# Patient Record
Sex: MALE | Race: WHITE | Employment: OTHER | URBAN - METROPOLITAN AREA
[De-identification: names, ages, dates, MRNs, and addresses within clinical notes are randomized per-mention and may not be internally consistent; named-entity substitution may affect disease eponyms.]

---

## 2021-12-08 ENCOUNTER — OFFICE VISIT (OUTPATIENT)
Dept: ORTHOPEDIC SURGERY | Age: 61
End: 2021-12-08
Payer: COMMERCIAL

## 2021-12-08 DIAGNOSIS — M25.562 LEFT KNEE PAIN, UNSPECIFIED CHRONICITY: Primary | ICD-10-CM

## 2021-12-08 PROCEDURE — 99202 OFFICE O/P NEW SF 15 MIN: CPT | Performed by: PHYSICIAN ASSISTANT

## 2021-12-08 NOTE — PROGRESS NOTES
filed for this visit.  -Oriented to person, place, and time  -mood and affect are appropriate    Knee exam - left knee exam shows;    -range of motion of R. Knee is full , and L. Knee is full. The patient does have  pain on motion  -there is not an effusion  - there is tenderness over the  medial region  -there are not any masses  - there is not ligamentous instability  -there is not  deformity noted. - tenderness laxity is not noted with  Valgus stress test.   -  tenderness laxity is not noted with  Varus stress test  -Mc Murrays testing Positive Medial/Positive Medial  -Anterior/posterior drawer testing negative    Contralateral Exam:  -No obvious deformities  -No abrasions or cellulitis noted, NVI   -Full ROM   -No joint laxity  -no palpable tenderness noted    Neurological exam:   -Deep tendon reflexes are Normal at the ankles with strong extensor hallicus longus motor strength bilaterally. --Distal pulses DP/PT:  present 2+    Skin exam:  There is not any cellulitis, lymphedema or cutaneous lesions noted in the lower extremities. Xray:  No orders to display     4v left knee  no fracture or dislocation noted      Assessment:   left knee strain, suspect possible small medial meniscus tear      Plan:  Educational materials distributed. Rest, ice, compression, and elevation (RICE) therapy. OTC analgesics as needed. PT referral.  Follow up in 2 weeks. Reviewed with him the possibility of meniscus injury. He does have a brace he said has helped him and he is doing some massage that seems to help as well. Discussed if PT does not improve his symptoms may benefit fro MRI to further evaluate. He is in agreement and will f/u with us in the next few weeks after he sees how PT goes. No orders of the defined types were placed in this encounter.

## 2021-12-14 ENCOUNTER — OFFICE VISIT (OUTPATIENT)
Dept: ORTHOPEDIC SURGERY | Age: 61
End: 2021-12-14
Payer: COMMERCIAL

## 2021-12-14 VITALS — WEIGHT: 315 LBS | HEIGHT: 69 IN | BODY MASS INDEX: 46.65 KG/M2

## 2021-12-14 DIAGNOSIS — S83.242A ACUTE MEDIAL MENISCUS TEAR, LEFT, INITIAL ENCOUNTER: Primary | ICD-10-CM

## 2021-12-14 DIAGNOSIS — M25.462 EFFUSION OF LEFT KNEE: ICD-10-CM

## 2021-12-14 DIAGNOSIS — M17.12 PRIMARY OSTEOARTHRITIS OF LEFT KNEE: ICD-10-CM

## 2021-12-14 DIAGNOSIS — M25.562 LEFT KNEE PAIN, UNSPECIFIED CHRONICITY: ICD-10-CM

## 2021-12-14 PROCEDURE — 99204 OFFICE O/P NEW MOD 45 MIN: CPT | Performed by: INTERNAL MEDICINE

## 2021-12-14 PROCEDURE — L1812 KO ELASTIC W/JOINTS PRE OTS: HCPCS | Performed by: INTERNAL MEDICINE

## 2021-12-14 RX ORDER — KETOROLAC TROMETHAMINE 10 MG/1
10 TABLET, FILM COATED ORAL 3 TIMES DAILY
Qty: 15 TABLET | Refills: 0 | Status: SHIPPED | OUTPATIENT
Start: 2021-12-14

## 2021-12-14 NOTE — PROGRESS NOTES
Chief Complaint:   Chief Complaint   Patient presents with    Knee Pain     left - onset 3 months ago - flared 6 wks ago. medial and posterior, pain is achy. doing much better now. History of Present Illness:       Patient is a 64 y.o. male presents with the above complaint. The symptoms began 3 weeksago and started with an injury. The patient describes a sharp, aching pain that does radiate. The symptoms are intermittent  and are show no change since the onset. He was working in the yard at the time of injury and felt a subjective \"pop\" in his knee associate with pain. He elected to treat this conservatively ultimately presented to emergency room 3 weeks ago was treated and released and most recently was seen in the after-hours orthopedic clinic. X-rays were performed as outlined below. Pain localizes to the medial side of the knee and  does not seems to follow a typical patella femoral provacative pattern. There are not mechanical symptoms that are active at this time. .  The patient denies subjective instability about the knee and denies new onset weakness of the lower extremity. Pain level 3    The patient denies a pattern of activity related swelling. Treatment to date: NSAIDS OTC with no improvement. There is no prior history of knee trauma. There is no prior history of autoimmune disease, inflammatory arthropathy, or crystal arthropathy. Past Medical History:      History reviewed. No pertinent past medical history. History reviewed. No pertinent surgical history.       Present Medications:         Current Outpatient Medications   Medication Sig Dispense Refill    ketorolac (TORADOL) 10 MG tablet Take 1 tablet by mouth three times daily Discontinue all other NSAIDs during the course of treatment and resumed thereafter 15 tablet 0    diclofenac sodium (VOLTAREN) 1 % GEL Apply 2 g topically (Patient not taking: Reported on 12/14/2021)       No current facility-administered medications for this visit. Allergies: Allergies   Allergen Reactions    Other Rash     Horse Dander        Social History:         Social History     Socioeconomic History    Marital status:      Spouse name: Not on file    Number of children: Not on file    Years of education: Not on file    Highest education level: Not on file   Occupational History    Not on file   Tobacco Use    Smoking status: Never Smoker    Smokeless tobacco: Never Used   Substance and Sexual Activity    Alcohol use: Not on file    Drug use: Not on file    Sexual activity: Not on file   Other Topics Concern    Not on file   Social History Narrative    Not on file     Social Determinants of Health     Financial Resource Strain:     Difficulty of Paying Living Expenses: Not on file   Food Insecurity:     Worried About Running Out of Food in the Last Year: Not on file    Michelle of Food in the Last Year: Not on file   Transportation Needs:     Lack of Transportation (Medical): Not on file    Lack of Transportation (Non-Medical):  Not on file   Physical Activity:     Days of Exercise per Week: Not on file    Minutes of Exercise per Session: Not on file   Stress:     Feeling of Stress : Not on file   Social Connections:     Frequency of Communication with Friends and Family: Not on file    Frequency of Social Gatherings with Friends and Family: Not on file    Attends Sikh Services: Not on file    Active Member of Clubs or Organizations: Not on file    Attends Club or Organization Meetings: Not on file    Marital Status: Not on file   Intimate Partner Violence:     Fear of Current or Ex-Partner: Not on file    Emotionally Abused: Not on file    Physically Abused: Not on file    Sexually Abused: Not on file   Housing Stability:     Unable to Pay for Housing in the Last Year: Not on file    Number of [place] in the Last Year: Not on file    Unstable Housing in the Last Year: Not on file        Review of Symptoms:    Pertinent items are noted in HPI    Review of systems reviewed from Patient History Form dated on today's date and   available in the patient's chart under the Media tab. Vital Signs: There were no vitals filed for this visit. General Exam:     Constitutional: Patient is adequately groomed with no evidence of malnutrition  Mental Status: The patient is oriented to time, place and person. The patient's mood and affect are appropriate. Vascular: Examination reveals no swelling or calf tenderness. Peripheral pulses are palpable and 2+. Lymphatics: no lymphadenopathy of the inguinal region or lower extremity      Physical Exam: left knee      Primary Exam:    Inspection: Mild effusion no deformity or atrophy      Palpation: Focal tenderness of the posterior medial joint line      Range of Motion: 0/120 only mild subjective tightness      Strength: Normal with SLR      Special Tests:   Lachman test negative, collateral ligament stressing stable, anterior/posterior drawer negative, medial and lateral [facility] testing negative, patella femoral provacative Negative      Skin: There are no rashes, ulcerations or lesions. Gait: Minimally antalgic      Reflex intact lower     Additional Comments:        Additional Examinations:           Right Lower Extremity: Examination of the right lower extremity does not show any tenderness, deformity or injury. Range of motion is unremarkable. There is no gross instability. There are no rashes, ulcerations or lesions. Strength and tone are normal.   Neurolgic -Light touch sensation and manual muscle testing normal L2-S1. No fasiculations. Pattella tendon and Achilles tendon reflexes +2 bilaterally.   Seated SLR negative        Office Imaging Results/Procedures PerformedToday:            Office Procedures:     Orders Placed This Encounter   Procedures    Breg Economy Hinged Knee Brace     Patient was prescribed a Breg Economy Hinged Knee Brace. The left knee will require stabilization / immobilization from this semi-rigid / rigid orthosis to improve their function. The orthosis will assist in protecting the affected area, provide functional support and facilitate healing. The patient was educated and fit by a healthcare professional with expert knowledge and specialization in brace application while under the direct supervision of the treating physician. Verbal and written instructions for the use of and application of this item were provided. They were instructed to contact the office immediately should the brace result in increased pain, decreased sensation, increased swelling or worsening of the condition. Other Outside Imaging and Testing Personally Reviewed:    XR KNEE LEFT (MIN 4 VIEWS)    Result Date: 12/8/2021  Radiology exam is complete. No Radiologist dictation. Please follow up with ordering provider. X-rays reviewed from 12/8/2021: There is grade 1-2 degenerative change affecting the tibiofemoral joints bilaterally lateral projection left knee patellofemoral joint is anatomic subtle spurring from the tibia posteriorly vertebral junction reveals grade 1-2 degenerative change normal alignment of the patella        Assessment   Impression: . Encounter Diagnoses   Name Primary?  Acute medial meniscus tear, left, initial encounter Yes    Primary osteoarthritis of left knee     Effusion of left knee     Left knee pain, unspecified chronicity               Plan:     MRI evaluation left knee evaluate severity of meniscal pathology suspected clinically  Activity modification meniscal protocol and functional knee bracing  Trial of high-dose NSAIDs-Toradol with GI precaution  Clinical follow-up after MRI    The nature of the finding, probable diagnosis and likely treatment was thoroughly discussed with the patient.  The options, risks, complications, alternative treatment as well as some of the differential diagnosis was discussed. The patient was thoroughly informed and all questions were answered. the patient indicated understanding and satisfaction with the discussion. Approximately 45  minutes was spent related to previewing pertinent medical documentation prior to the patient's visit along with counseling during the patient's visit with respect to treatment options inclusive of alternatives to treatment and the complications and risks related to those treatment options along with expectations of outcome related to those treatments and inclusive of time in the documentation and ordering of testing and treatment after the visit. Orders:        Orders Placed This Encounter   Procedures    Breg Economy Hinged Knee Brace     Patient was prescribed a Breg Economy Hinged Knee Brace. The left knee will require stabilization / immobilization from this semi-rigid / rigid orthosis to improve their function. The orthosis will assist in protecting the affected area, provide functional support and facilitate healing. The patient was educated and fit by a healthcare professional with expert knowledge and specialization in brace application while under the direct supervision of the treating physician. Verbal and written instructions for the use of and application of this item were provided. They were instructed to contact the office immediately should the brace result in increased pain, decreased sensation, increased swelling or worsening of the condition. Disclaimer: \"This note was dictated with voice recognition software. Though review and correction are routine, we apologize for any errors. \"

## 2021-12-23 ENCOUNTER — OFFICE VISIT (OUTPATIENT)
Dept: ORTHOPEDIC SURGERY | Age: 61
End: 2021-12-23
Payer: COMMERCIAL

## 2021-12-23 VITALS — WEIGHT: 315 LBS | HEIGHT: 69 IN | BODY MASS INDEX: 46.65 KG/M2

## 2021-12-23 DIAGNOSIS — S83.232D COMPLEX TEAR OF MEDIAL MENISCUS OF LEFT KNEE AS CURRENT INJURY, SUBSEQUENT ENCOUNTER: ICD-10-CM

## 2021-12-23 DIAGNOSIS — M17.12 PRIMARY OSTEOARTHRITIS OF LEFT KNEE: ICD-10-CM

## 2021-12-23 PROCEDURE — 99214 OFFICE O/P EST MOD 30 MIN: CPT | Performed by: INTERNAL MEDICINE

## 2021-12-23 NOTE — PROGRESS NOTES
Chief Complaint:   Chief Complaint   Patient presents with    Knee Pain     left, about the same, still lingering soreness          History of Present Illness:       Patient is a 64 y.o. male returns follow up for the above complaint. The patient was last seen approximately 12 daysago. The symptoms show no change since the last visit. In the interim MRI completed which is outlined below in detail. The did not remember to start the course of Toradol despite the symptoms are unchanged    He continues with functional knee bracing    Pain localized to the medial compartment region of the knee    Pain levels 1/10    No pattern of active related swelling he denies any mechanical symptoms at this time         Past Medical History:      No past medical history on file. Present Medications:         Current Outpatient Medications   Medication Sig Dispense Refill    diclofenac sodium (VOLTAREN) 1 % GEL Apply 2 g topically (Patient not taking: Reported on 12/14/2021)      ketorolac (TORADOL) 10 MG tablet Take 1 tablet by mouth three times daily Discontinue all other NSAIDs during the course of treatment and resumed thereafter 15 tablet 0     No current facility-administered medications for this visit. Allergies: Allergies   Allergen Reactions    Other Rash     Horse Dander           Review of Systems:    Pertinent items are noted in HPI       Vital Signs: There were no vitals filed for this visit. General Exam:     Constitutional: Patient is adequately groomed with no evidence of malnutrition    Physical Exam: left knee      Primary Exam:    Inspection: Appreciable effusion      Palpation: Mild tenderness over the M JL      Range of Motion: Stable unchanged from previous normal with SLR      Strength: Normal with SLR      Special Tests: Steinmann's test negative      Skin: There are no rashes, ulcerations or lesions.       Gait: Nonantalgic      Neurovascular - non focal and intact       Additional Comments:        Additional Examinations:                   Office Imaging Results/Procedures PerformedToday:           Office Procedures:     Orders Placed This Encounter   Procedures    Ambulatory referral to Physical Therapy     Referral Priority:   Routine     Referral Type:   Eval and Treat     Referral Reason:   Specialty Services Required     Number of Visits Requested:   1    [address] [facility] (For Auth/Precert)     Standing Status:   Future     Standing Expiration Date:   2022           Other Outside Imaging and Testing Personally Reviewed:    MRI LOWER EXTREMITY W JT WO CONTRAST    Result Date: 2021  [facility]: [id number] #: [id number] Procedure: MR Left Knee w/o Contrast ; Reason for Exam: Other tear of medial meniscus, current injury, left knee, initial encounter, Pain in left knee, Unilateral primary osteoarthritis, left knee, Effusion, left knee This document is confidential medical information. Unauthorized disclosure or use of this information is prohibited by law. If you are not the intended recipient of this document, please advise us by calling immediately [phone]. [place] [place] [address] [address] Patient Name: Margarita Munroe Case ID: [id number] Patient : 1960 Referring Physician: Rosalio Ocasio MD Exam Date: 2021 Exam Description: MR Left Knee w/o Contrast HISTORY:  Acute medial meniscus tear. Left knee pain. Primary osteoarthritis. Left knee effusion. TECHNICAL FACTORS:  Long- and short-axis fat- and water-weighted images were performed. COMPARISON:  None. FINDINGS:  The ACL and PCL are intact.  In the medial compartment, at least a 2.5cm long, complex, inflamed tear involving the outer surface of the posterior horn-body junction of the medial meniscus with an undersurface cleavage and radial longitudinal components is seen resulting in extrusion into an inflamed medial gutter. 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 No osteoarthritis. The MCL is intact. Lateral compartment without meniscal tears, chondromalacia or osteoarthritis. Intact lateral collateral ligament complex. Anterior compartment with multifocal penetrating chondral fissures and mild subchondral osteoedema along the lateral trochlea (grade 4 chondromalacia). No osteoarthritis. No patellar or trochlear dysplasia. No patellar dislocation or subluxation. The MPFL and lateral  patellar retinaculum are intact. Small suprapatellar effusion. Quadriceps and patellar tendons are normal. Small fluid distension of the gastrocnemius/semimembranous bursa. Flexor mechanism and neurovascular bundle are unremarkable. CONCLUSION: 1. A 2.5cm, complex, inflamed tear at the outer zone of posterior horn and body of medial meniscus with undersurface cleavage and radial longitudinal components extruding into an inflamed medial gutter. 2. Penetrating chondral fissures along the lateral trochlea with mild subchondral osteoedema (grade 4 chondromalacia). 3. Small suprapatellar effusion. Thank you for the opportunity to provide your interpretation. Mariely Lara MD A: SARAHI/TORRES/yudelka 12/22/2021 8:13 PM T: YUDELKA 12/22/2021 10:55 AM              Assessment   Impression: . Encounter Diagnoses   Name Primary?  Primary osteoarthritis of left knee     Complex tear of medial meniscus of left knee as current injury, subsequent encounter             Plan:        Activity modification lumbar disc protocol  Celebrex daily with GI precaution and as needed use of Flexeril nightly  MRI evaluation lumbar spine evaluate severity of discopathy/stenosis suspected clinically  Consider  for lumbar spine intervention injection as needed           Orders:        Orders Placed This Encounter   Procedures    Ambulatory referral to Physical Therapy     Referral Priority:   Routine     Referral Type:   Eval and Treat     Referral Reason: Specialty Services Required     Number of Visits Requested:   1    [address] [facility] (For Auth/Precert)     Standing Status:   Future     Standing Expiration Date:   12/23/2022         Gareth Sheppard MD.      Disclaimer: \"This note was dictated with voice recognition software. Though review and correction are routine, we apologize for any errors. \"

## 2022-01-03 ENCOUNTER — HOSPITAL ENCOUNTER (OUTPATIENT)
Dept: PHYSICAL THERAPY | Age: 62
Setting detail: THERAPIES SERIES
Discharge: HOME | End: 2022-01-03
Payer: COMMERCIAL

## 2022-01-03 PROCEDURE — 97161 PT EVAL LOW COMPLEX 20 MIN: CPT

## 2022-01-03 PROCEDURE — 97110 THERAPEUTIC EXERCISES: CPT

## 2022-01-03 NOTE — PLAN OF CARE
[facility]  [address] [place] [address]  Phone [phone]   Fax [phone]                                                       Physical Therapy Certification    Dear Referring Practitioner: Nella Sicard, MD,    We had the pleasure of evaluating the following patient for physical therapy services at [address] [place] [address]. A summary of our findings can be found in the initial assessment below. This includes our plan of care. If you have any questions or concerns regarding these findings, please do not hesitate to contact me at the office phone number checked above.   Thank you for the referral.       Physician Signature:_______________________________Date:__________________  By signing above (or electronic signature), therapists plan is approved by physician      Patient: Gissell Pang   : 1960   MRN: [id number]  Referring Physician: Referring Practitioner: Nella Sicard, MD      Evaluation Date: 1/3/2022      Medical Diagnosis Information:  Diagnosis: F45.367F (ICD-10-CM) - Acute medial meniscus tear, left, initial [id number] (ICD-10-CM) - Primary osteoarthritis of left kneeS83.232D (ICD-10-CM) - Complex tear of medial meniscus of left knee as current injury, subsequent encounter   Treatment Diagnosis: L knee dysfunction                                         Insurance information: PT Insurance Information: BCBS     Precautions/ Contra-indications: OA, MRI confirmed meniscus tear L knee    C-SSRS Triggered by Intake questionnaire (Past 2 wk assessment):   [x] No, Questionnaire did not trigger screening.   [] Yes, Patient intake triggered further evaluation      [] C-SSRS Screening completed  [] PCP notified via Plan of Care  [] Emergency services notified     Latex Allergy:  [x]NO      []YES  Preferred Language for Healthcare:   [x]English       []other:    SUBJECTIVE: Patient stated complaint: See body chart     Relevant Medical History: OA  Functional Disability Index: LEFS 47/80    Pain Scale: 0-1/10  Easing factors: massage gun, rest  Provocative factors: See body chart     Type: []Constant   [x]Intermittent  []Radiating [x]Localized []other:     Numbness/Tingling: Patient reports no N/T at this time. Occupation/School: retired      Living Status/Prior Level of Function: Independent with ADLs and IADLs, golf    OBJECTIVE:     ROM LEFT RIGHT   HIP Flex Equal bilateral Equal bilateral   HIP Abd     HIP Ext     HIP IR     HIP ER     Knee ext 0 0   Knee Flex 115 no pain  115   Ankle PF     Ankle DF     Ankle In     Ankle Ev     Strength lb  LEFT RIGHT   HIP Flexors     HIP Abductors 45.0 45.3   HIP Ext     Hip ER     Knee EXT (quad) 64.2 79.8   Knee Flex (HS) 64.9 66.0   Ankle DF     Ankle PF     Ankle Inv     Ankle EV          Circumference  Mid apex  7 cm prox               Balance:      Reflexes/Sensation:    [x]Dermatomes/Myotomes intact    [x]Reflexes equal and normal bilaterally   []Other:     Joint mobility: Patient presents wit normal joint mobility including patellar mobility. [x]Normal    []Hypo   []Hyper    Palpation: Patient reported no joint line tenderness. Mild hamstring tightness noted on palpation. Functional Mobility/Transfers: Patient limited by decreased core strength during session with transitional movements. Posture: Patient presents with a functional knee brace on L. Bandages/Dressings/Incisions: NA    Gait: (include devices/WB status) Patient presents with a toe out gait bilaterally with slightly reduced stance time on L. Orthopedic Special Tests:                        [x] Patient history, allergies, meds reviewed. Medical chart reviewed. See intake form.      Review Of Systems (ROS):  [x]Performed Review of systems (Integumentary, CardioPulmonary, Neurological) by intake and observation. Intake form has been scanned into medical record. Patient has been instructed to contact their primary care physician regarding ROS issues if not already being addressed at this time. Co-morbidities/Complexities (which will affect course of rehabilitation):   []None           Arthritic conditions   []Rheumatoid arthritis (M05.9)  [x]Osteoarthritis (M19.91)   Cardiovascular conditions   []Hypertension (I10)  []Hyperlipidemia (E78.5)  []Angina pectoris (I20)  []Atherosclerosis (I70)   Musculoskeletal conditions   []Disc pathology   []Congenital spine pathologies   []Prior surgical intervention  []Osteoporosis (M81.8)  []Osteopenia (M85.8)   Endocrine conditions   []Hypothyroid (E03.9)  []Hyperthyroid Gastrointestinal conditions   []Constipation (T84.79)   Metabolic conditions   []Morbid obesity (E66.01)  []Diabetes type 1(E10.65) or 2 (E11.65)   []Neuropathy (G60.9)     Pulmonary conditions   []Asthma (J45)  []Coughing   []COPD (J44.9)   Psychological Disorders  []Anxiety (F41.9)  []Depression (F32.9)   []Other:   []Other:          Barriers to/and or personal factors that will affect rehab potential:              []Age  []Sex              []Motivation/Lack of Motivation                        []Co-Morbidities              []Cognitive Function, education/learning barriers              []Environmental, home barriers              []profession/work barriers  []past PT/medical experience  []other:  Justification:     Falls Risk Assessment (30 days):   [x] Falls Risk assessed and no intervention required.   [] Falls Risk assessed and Patient requires intervention due to being higher risk   TUG score (>12s at risk):     [] Falls education provided, including         ASSESSMENT:   Functional Impairments:     [x]Noted lumbar/proximal hip/LE joint hypomobility   []Decreased LE functional ROM   [x]Decreased core/proximal hip strength and neuromuscular control   [x]Decreased LE functional strength   []Reduced balance/proprioceptive control   []other:      Functional Activity Limitations (from functional questionnaire and intake)   [x]Reduced ability to tolerate prolonged functional positions   []Reduced ability or difficulty with changes of positions or transfers between positions   [x]Reduced ability to maintain good posture and demonstrate good body mechanics with sitting, bending, and lifting   []Reduced ability to sleep   [] Reduced ability or tolerance with driving and/or computer work   [x]Reduced ability to perform lifting, carrying tasks   [x]Reduced ability to squat   [x]Reduced ability to forward bend   [x]Reduced ability to ambulate prolonged functional periods/distances/surfaces   [x]Reduced ability to ascend/descend stairs   []Reduced ability to run, hop, cut or jump   []other:    Participation Restrictions   [x]Reduced participation in self care activities   [x]Reduced participation in home management activities   [x]Reduced participation in work activities   [x]Reduced participation in social activities. [x]Reduced participation in sport/recreation activities. Classification :    []Signs/symptoms consistent with post-surgical status including decreased ROM, strength and function.    []Signs/symptoms consistent with joint sprain/strain   []Signs/symptoms consistent with patella-femoral syndrome   []Signs/symptoms consistent with knee OA/hip OA   [x]Signs/symptoms consistent with internal derangement of knee/Hip   []Signs/symptoms consistent with functional hip weakness/NMR control      []Signs/symptoms consistent with tendinitis/tendinosis    []signs/symptoms consistent with pathology which may benefit from Dry needling      []other:      Prognosis/Rehab Potential:      []Excellent   [x]Good    []Fair   []Poor    Tolerance of evaluation/treatment:    []Excellent   [x]Good    []Fair   []Poor    Physical Therapy Evaluation Complexity Justification  [x] A history of present problem with:  [] no personal factors and/or comorbidities that impact the plan of care;  [x]1-2 personal factors and/or comorbidities that impact the plan of care  []3 personal factors and/or comorbidities that impact the plan of care  [x] An examination of body systems using standardized tests and measures addressing any of the following: body structures and functions (impairments), activity limitations, and/or participation restrictions;:  [x] a total of 1-2 or more elements   [] a total of 3 or more elements   [] a total of 4 or more elements   [x] A clinical presentation with:  [x] stable and/or uncomplicated characteristics   [] evolving clinical presentation with changing characteristics  [] unstable and unpredictable characteristics;   [x] Clinical decision making of [x] low, [] moderate, [] high complexity using standardized patient assessment instrument and/or measurable assessment of functional outcome. [x] EVAL (LOW) 07618 (typically 30 minutes face-to-face)  [] EVAL (MOD) 14282 (typically 30 minutes face-to-face)  [] EVAL (HIGH) 52136 (typically 45 minutes face-to-face)  [] RE-EVAL     PLAN:   Frequency/Duration:  1-2 days per week for 4-6 Weeks:  Interventions:  [x]  Therapeutic exercise including: strength training, ROM, for Lower extremity and core   [x]  NMR activation and proprioception for LE, Glutes and Core   [x]  Manual therapy as indicated for LE, Hip and spine to include: Dry Needling/IASTM, STM, PROM, Gr I-IV mobilizations, manipulation. [x] Modalities as needed that may include: thermal agents, E-stim, Biofeedback, US, iontophoresis as indicated  [x] Patient education on joint protection, postural re-education, activity modification, progression of HEP. HEP instruction: (see scanned forms)    GOALS:  Patient stated goal: To gain strength in the L leg. [] Progressing: [] Met: [] Not Met: [] Adjusted    Therapist goals for Patient:   Short Term Goals: To be achieved in: 2 weeks  1.  Independent in HEP and progression per patient tolerance, in order to prevent re-injury. [] Progressing: [] Met: [] Not Met: [] Adjusted  2. Patient will have a decrease in pain to facilitate improvement in movement, function, and ADLs as indicated by Functional Deficits. [] Progressing: [] Met: [] Not Met: [] Adjusted    Long Term Goals: To be achieved in: 4-6 weeks  1. Disability index score of 25% or less for the LEFS to assist with reaching prior level of function. [] Progressing: [] Met: [] Not Met: [] Adjusted  2. Patient will demonstrate an increase in Strength to good proximal hip strength and control, within 5lb HHD in LE to allow for proper functional mobility as indicated by patients Functional Deficits. [] Progressing: [] Met: [] Not Met: [] Adjusted  3. Patient will return to prolonged ambulation, squatting, stair ascending/descending, yard/housework, and functional activities without increased symptoms or restriction. [] Progressing: [] Met: [] Not Met: [] Adjusted  4.  To gain strength in the L LE to return to PLOF (patient specific functional goal)    [] Progressing: [] Met: [] Not Met: [] Adjusted     Electronically signed by:  Carly Noland PT

## 2022-01-03 NOTE — FLOWSHEET NOTE
[facility]    Physical Therapy Treatment Note/ Progress Report:     Date:  1/3/2022    Patient Name:  Elder Torres    :  1960  MRN: [id number]  Medical/Treatment Diagnosis Information:  · Diagnosis: Z52.664C (ICD-10-CM) - Acute medial meniscus tear, left, initial dhujfyzuhG01.12 (ICD-10-CM) - Primary osteoarthritis of left kneeS83.232D (ICD-10-CM) - Complex tear of medial meniscus of left knee as current injury, subsequent encounter  · Treatment Diagnosis: L knee dysfunction  Insurance/Certification information:  PT Insurance Information: [facility]  Physician Information:  Referring Practitioner: Silvia Juares MD  Plan of care signed (Y/N):     Date of Patient follow up with Physician:      Progress Report: []  Yes  []  No     Functional Scale:  LEFS: 47/80  Date: 1/3/22    Date Range for reporting period:  Beginnin/3/22  Ending:      Progress report due (10 Rx/or 30 days whichever is less): 71     Recertification due (POC duration/ or 90 days whichever is less): 22     Visit # Insurance Allowable Auth Needed   1 MN []Yes    []No     Pain level:  0-1/10     SUBJECTIVE:  See eval    OBJECTIVE: See eval   Observation:    Test measurements:      RESTRICTIONS/PRECAUTIONS:  prolonged ambulation, squatting, stair ascending/descending, yard/housework,, pivoting    Exercises/Interventions:     Therapeutic Ex 25' Resistance Sets/sec Reps Notes          SLR  1 10    bridges  2 8 5 sec hold    Wall sit  4 To fatigue    Side steps  4 10ft    ecc calf raise  1 20    Ecc squat tap   2 15                                         Therapeutic Activities                                                               Manual Intervention       Knee mobs/PROM       Tib/Fem Mobs       Patella Mobs       Ankle mobs                     NMR re-education                                                                          Therapeutic Exercise and NMR EXR  [] (51607) Provided verbal/tactile cueing for activities related to strengthening, flexibility, endurance, ROM for improvements in LE, proximal hip, and core control with self care, mobility, lifting, ambulation.  [] (17369) Provided verbal/tactile cueing for activities related to improving balance, coordination, kinesthetic sense, posture, motor skill, proprioception  to assist with LE, proximal hip, and core control in self care, mobility, lifting, ambulation and eccentric single leg control.      NMR and Therapeutic Activities:    [] (22303 or 77281) Provided verbal/tactile cueing for activities related to improving balance, coordination, kinesthetic sense, posture, motor skill, proprioception and motor activation to allow for proper function of core, proximal hip and LE with self care and ADLs  [] (22107) Gait Re-education- Provided training and instruction to the patient for proper LE, core and proximal hip recruitment and positioning and eccentric body weight control with ambulation re-education including up and down stairs     Home Exercise Program:    [x] (68401) Reviewed/Progressed HEP activities related to strengthening, flexibility, endurance, ROM of core, proximal hip and LE for functional self-care, mobility, lifting and ambulation/stair navigation   [] (11411)Reviewed/Progressed HEP activities related to improving balance, coordination, kinesthetic sense, posture, motor skill, proprioception of core, proximal hip and LE for self care, mobility, lifting, and ambulation/stair navigation      Manual Treatments:  PROM / STM / Oscillations-Mobs:  G-I, II, III, IV (PA's, Inf., Post.)  [] (96705) Provided manual therapy to mobilize LE, proximal hip and/or LS spine soft tissue/joints for the purpose of modulating pain, promoting relaxation,  increasing ROM, reducing/eliminating soft tissue swelling/inflammation/restriction, improving soft tissue extensibility and allowing for proper ROM for normal function with self care, mobility, lifting and ambulation. Modalities:      Charges:  Timed Code Treatment Minutes: 25   Total Treatment Minutes: 55       [x] EVAL (LOW) 35648 (typically 20 minutes face-to-face)  [] EVAL (MOD) 86023 (typically 30 minutes face-to-face)  [] EVAL (HIGH) 06782 (typically 45 minutes face-to-face)  [] RE-EVAL     [x] JS(98484) x  2         [] IONTO (84944)  [] NMR (38777) x     [] VASO (99960)  [] Manual (52136) x     [] Other:  [] TA (97987)x     [] Mech Traction (15348)  [] ES(attended) (05952)     [] ES (un) (77660): If BWC Please Indicate Time In/Out and Total Minutes  CPT Code Time in Time out Total Min                                         GOALS:  Patient stated goal: To gain strength in the L leg. [] Progressing: [] Met: [] Not Met: [] Adjusted    Therapist goals for Patient:   Short Term Goals: To be achieved in: 2 weeks  1. Independent in HEP and progression per patient tolerance, in order to prevent re-injury. [] Progressing: [] Met: [] Not Met: [] Adjusted  2. Patient will have a decrease in pain to facilitate improvement in movement, function, and ADLs as indicated by Functional Deficits. [] Progressing: [] Met: [] Not Met: [] Adjusted    Long Term Goals: To be achieved in: 4-6 weeks  1. Disability index score of 25% or less for the LEFS to assist with reaching prior level of function. [] Progressing: [] Met: [] Not Met: [] Adjusted  2. Patient will demonstrate an increase in Strength to good proximal hip strength and control, within 5lb HHD in LE to allow for proper functional mobility as indicated by patients Functional Deficits. [] Progressing: [] Met: [] Not Met: [] Adjusted  3. Patient will return to prolonged ambulation, squatting, stair ascending/descending, yard/housework, and functional activities without increased symptoms or restriction. [] Progressing: [] Met: [] Not Met: [] Adjusted  4.  To gain strength in the L LE to return to PLOF (patient specific functional goal)    [] Progressing: [] Met: [] Not Met: [] Adjusted     Progression Towards Functional goals:  [] Patient is progressing as expected towards functional goals listed. [] Progression is slowed due to complexities listed. [] Progression has been slowed due to co-morbidities. [x] Plan just implemented, too soon to assess goals progression  [] Other:     ASSESSMENT:  See eval    Return to Play: (if applicable)   []  Stage 1: Intro to Strength   []  Stage 2: Return to Run and Strength   []  Stage 3: Return to Jump and Strength   []  Stage 4: Dynamic Strength and Agility   []  Stage 5: Sport Specific Training     []  Ready to Return to Play, Meets All Above Stages   []  Not Ready for Return to Sports   Comments:            Treatment/Activity Tolerance:  [x] Patient tolerated treatment well [] Patient limited by fatique  [] Patient limited by pain  [] Patient limited by other medical complications  [] Other:     Overall Progression Towards Functional goals/ Treatment Progress Update:  [] Patient is progressing as expected towards functional goals listed. [] Progression is slowed due to complexities/Impairments listed. [] Progression has been slowed due to co-morbidities. [x] Plan just implemented, too soon to assess goals progression <30days   [] Goals require adjustment due to lack of progress  [] Patient is not progressing as expected and requires additional follow up with physician  [] Other    Prognosis for POC: [x] Good [] Fair  [] Poor    Patient requires continued skilled intervention: [x] Yes  [] No        PLAN: See eval  [] Continue per plan of care [] Alter current plan (see comments)  [x] Plan of care initiated [] Hold pending MD visit [] Discharge    Electronically signed by: Arianna Frias PT    Note: If patient does not return for scheduled/recommended follow up visits, this note will serve as a discharge from care along with the most recent update on progress.

## 2022-01-10 ENCOUNTER — HOSPITAL ENCOUNTER (OUTPATIENT)
Dept: PHYSICAL THERAPY | Age: 62
Setting detail: THERAPIES SERIES
Discharge: HOME | End: 2022-01-10
Payer: COMMERCIAL

## 2022-01-10 NOTE — PROGRESS NOTES
[facility]    Physical Therapy  Cancellation/No-show Note  Patient Name:  Breanna Aden  :  1960   Date:  1/10/2022  Cancelled visits to date: 1  No-shows to date: 0    For today's appointment patient:  [x]  Cancelled  []  Rescheduled appointment  []  No-show     Reason given by patient:  []  Patient ill  []  Conflicting appointment  []  No transportation    []  Conflict with work  []  No reason given  [x]  Other:  Patient is currently out of town. Comments:      Phone call information:   []  Phone call made today to patient at _ time at number provided:      []  Patient answered, conversation as follows:    []  Patient did not answer, message left as follows:  []  Phone call not made today  [x]  Phone call not needed - pt contacted us to cancel and provided reason for cancellation.      Electronically signed by:  Nishi Carbone, PT, PT

## 2022-01-18 ENCOUNTER — HOSPITAL ENCOUNTER (OUTPATIENT)
Dept: PHYSICAL THERAPY | Age: 62
Setting detail: THERAPIES SERIES
Discharge: HOME | End: 2022-01-18
Payer: COMMERCIAL

## 2022-01-18 PROCEDURE — 97110 THERAPEUTIC EXERCISES: CPT

## 2022-01-18 NOTE — FLOWSHEET NOTE
[place] [facility] [employer]    Physical Therapy Treatment Note/ Progress Report:     Date:  2022    Patient Name:  Elder Torres    :  1960  MRN: [id number]  Medical/Treatment Diagnosis Information:  · Diagnosis: G16.213H (ICD-10-CM) - Acute medial meniscus tear, left, initial [id number] (ICD-10-CM) - Primary osteoarthritis of left kneeS83.232D (ICD-10-CM) - Complex tear of medial meniscus of left knee as current injury, subsequent encounter  · Treatment Diagnosis: L knee dysfunction  Insurance/Certification information:  PT Insurance Information: [facility]  Physician Information:  Referring Practitioner: Silvia Juares MD  Plan of care signed (Y/N):     Date of Patient follow up with Physician:      Progress Report: []  Yes  []  No     Functional Scale:  LEFS: 47/80  Date: 1/3/22    Date Range for reporting period:  Beginnin/3/22  Ending:      Progress report due (10 Rx/or 30 days whichever is less): 79     Recertification due (POC duration/ or 90 days whichever is less): 22     Visit # Insurance Allowable Auth Needed   2 MN []Yes    []No     Pain level:  0-1/10     SUBJECTIVE:  Patient reports some general ache in the L knee. Patient reports he has increased R groin pain that he feels is related to increased activity. Patient believes he \"pulled\" the L hip yesterday shoveling snow. Patient reports increased ache in L knee following increased activity. Patient continues to wear brace out of the house. Currently R hip is a 4/10.      OBJECTIVE: See eval   Observation:    Test measurements:      RESTRICTIONS/PRECAUTIONS:  prolonged ambulation, squatting, stair ascending/descending, yard/housework,, pivoting    Exercises/Interventions:     Therapeutic Ex 45' Resistance Sets/sec Reps Notes          SLR  2 10 bilateral   bridges  2 8 10 sec hold    Wall sit  4 To fatigue    Side steps  4 10ft    ecc calf raise  1 20    Ecc squat tap   2 15    SL abd  2 8-10     LP DL/SL ecc 140/150/90 2 8-10    CC TKE 20/25 2 10    MH abd/ext 60 2 10             Therapeutic Activities                                                               Manual Intervention       Knee mobs/PROM       Tib/Fem Mobs       Patella Mobs       Ankle mobs                     NMR re-education                                                                          Therapeutic Exercise and NMR EXR  [x] (52248) Provided verbal/tactile cueing for activities related to strengthening, flexibility, endurance, ROM for improvements in LE, proximal hip, and core control with self care, mobility, lifting, ambulation.  [] (33902) Provided verbal/tactile cueing for activities related to improving balance, coordination, kinesthetic sense, posture, motor skill, proprioception  to assist with LE, proximal hip, and core control in self care, mobility, lifting, ambulation and eccentric single leg control.      NMR and Therapeutic Activities:    [] (66417 or 70478) Provided verbal/tactile cueing for activities related to improving balance, coordination, kinesthetic sense, posture, motor skill, proprioception and motor activation to allow for proper function of core, proximal hip and LE with self care and ADLs  [] (78371) Gait Re-education- Provided training and instruction to the patient for proper LE, core and proximal hip recruitment and positioning and eccentric body weight control with ambulation re-education including up and down stairs     Home Exercise Program:    [x] (01892) Reviewed/Progressed HEP activities related to strengthening, flexibility, endurance, ROM of core, proximal hip and LE for functional self-care, mobility, lifting and ambulation/stair navigation   [] (24591)Reviewed/Progressed HEP activities related to improving balance, coordination, kinesthetic sense, posture, motor skill, proprioception of core, proximal hip and LE for self care, mobility, lifting, and within 5lb HHD in LE to allow for proper functional mobility as indicated by patients Functional Deficits. [] Progressing: [] Met: [] Not Met: [] Adjusted  3. Patient will return to prolonged ambulation, squatting, stair ascending/descending, yard/housework, and functional activities without increased symptoms or restriction. [] Progressing: [] Met: [] Not Met: [] Adjusted  4. To gain strength in the L LE to return to PLOF (patient specific functional goal)    [] Progressing: [] Met: [] Not Met: [] Adjusted     Progression Towards Functional goals:  [] Patient is progressing as expected towards functional goals listed. [] Progression is slowed due to complexities listed. [] Progression has been slowed due to co-morbidities. [x] Plan just implemented, too soon to assess goals progression  [] Other:     ASSESSMENT:  Patient tolerated session well with new exercises incorporated. Patient encouraged to continue strengthening at home with HEP. Patient reports apprehension to returning to gym due to [place]. Patient would continue to benefit from strengthening of bilateral LE to improve function. Return to Play: (if applicable)   []  Stage 1: Intro to Strength   []  Stage 2: Return to Run and Strength   []  Stage 3: Return to Jump and Strength   []  Stage 4: Dynamic Strength and Agility   []  Stage 5: Sport Specific Training     []  Ready to Return to Play, Meets All Above Stages   []  Not Ready for Return to Sports   Comments:            Treatment/Activity Tolerance:  [x] Patient tolerated treatment well [] Patient limited by fatique  [] Patient limited by pain  [] Patient limited by other medical complications  [] Other:     Overall Progression Towards Functional goals/ Treatment Progress Update:  [] Patient is progressing as expected towards functional goals listed. [] Progression is slowed due to complexities/Impairments listed. [] Progression has been slowed due to co-morbidities.   [x] Plan just implemented, too soon to assess goals progression <30days   [] Goals require adjustment due to lack of progress  [] Patient is not progressing as expected and requires additional follow up with physician  [] Other    Prognosis for POC: [x] Good [] Fair  [] Poor    Patient requires continued skilled intervention: [x] Yes  [] No        PLAN: Focus on strengthening of the quadriceps and proximal hip. [x] Continue per plan of care [] Alter current plan (see comments)  [] Plan of care initiated [] Hold pending MD visit [] Discharge    Electronically signed by: Matthew Diego PT    Note: If patient does not return for scheduled/recommended follow up visits, this note will serve as a discharge from care along with the most recent update on progress.

## 2022-01-19 ENCOUNTER — TELEPHONE (OUTPATIENT)
Dept: ORTHOPEDIC SURGERY | Age: 62
End: 2022-01-19

## 2022-01-19 NOTE — TELEPHONE ENCOUNTER
LVM for pt that Durolane injection is approved and he can call to sched at anytime, as approval is good until 1/13/23

## 2022-01-24 ENCOUNTER — HOSPITAL ENCOUNTER (OUTPATIENT)
Dept: PHYSICAL THERAPY | Age: 62
Setting detail: THERAPIES SERIES
Discharge: HOME | End: 2022-01-24
Payer: COMMERCIAL

## 2022-01-24 PROCEDURE — 97530 THERAPEUTIC ACTIVITIES: CPT

## 2022-01-24 PROCEDURE — 97110 THERAPEUTIC EXERCISES: CPT

## 2022-01-24 NOTE — FLOWSHEET NOTE
[facility] [employer]    Physical Therapy Treatment Note/ Progress Report:     Date:  2022    Patient Name:  Yeyo Grigsby    :  1960  MRN: [id number]  Medical/Treatment Diagnosis Information:  · Diagnosis: T66.460C (ICD-10-CM) - Acute medial meniscus tear, left, initial [id number] (ICD-10-CM) - Primary osteoarthritis of left kneeS83.232D (ICD-10-CM) - Complex tear of medial meniscus of left knee as current injury, subsequent encounter  · Treatment Diagnosis: L knee dysfunction  Insurance/Certification information:  PT Insurance Information: [facility]  Physician Information:  Referring Practitioner: Jeannie Logan MD  Plan of care signed (Y/N):     Date of Patient follow up with Physician:      Progress Report: [x]  Yes  []  No     Functional Scale:  LEFS: 69/80  Date: 22    Date Range for reporting period:  Beginnin/3/22  Endin22    Progress report due (10 Rx/or 30 days whichever is less):      Recertification due (POC duration/ or 90 days whichever is less): 22     Visit # Insurance Allowable Auth Needed   3 MN []Yes    []No     Pain level:  0-1/10     SUBJECTIVE:  Patient reports his left knee continues to feel good with reduced pain. Patient states function continues to improve. Patient reports he intends to return to the gym next week. Patient reports he feels he will be able to continue to strengthen at the gym on his own and wants to try to perform HEP independently.      OBJECTIVE: See eval   Observation:    Test measurements:   22     ROM LEFT RIGHT   Knee ext 0 0   Knee Flex 120 120   Strength lb  LEFT RIGHT   HIP Flexors       HIP Abductors 44.6 45.0   HIP Ext       Hip ER       Knee EXT (quad) 75.9 83.1   Knee Flex (HS) 78.7 66.2          RESTRICTIONS/PRECAUTIONS:  prolonged ambulation, squatting, stair ascending/descending, yard/housework,, pivoting    Exercises/Interventions: Therapeutic Ex 45' Resistance Sets/sec Reps Notes          SLR  2 10 bilateral   bridges  2 10 10 sec hold           Wall sit  4 To fatigue    Side steps  4 10ft    ecc calf raise  1 20    Ecc squat tap   2 15    SL abd  2 8-10  2 sec hold    LP DL/SL ecc 170/100-120 2 8-10    CC TKE 20/25 2 10    MH abd/ext 60 2 10             Therapeutic Activities 10\"             Functional testing and HEP update and activity education. Manual Intervention       Knee mobs/PROM       Tib/Fem Mobs       Patella Mobs       Ankle mobs                     NMR re-education                                                                          Therapeutic Exercise and NMR EXR  [x] (63419) Provided verbal/tactile cueing for activities related to strengthening, flexibility, endurance, ROM for improvements in LE, proximal hip, and core control with self care, mobility, lifting, ambulation.  [] (40365) Provided verbal/tactile cueing for activities related to improving balance, coordination, kinesthetic sense, posture, motor skill, proprioception  to assist with LE, proximal hip, and core control in self care, mobility, lifting, ambulation and eccentric single leg control.      NMR and Therapeutic Activities:    [] (70313 or 69775) Provided verbal/tactile cueing for activities related to improving balance, coordination, kinesthetic sense, posture, motor skill, proprioception and motor activation to allow for proper function of core, proximal hip and LE with self care and ADLs  [] (02523) Gait Re-education- Provided training and instruction to the patient for proper LE, core and proximal hip recruitment and positioning and eccentric body weight control with ambulation re-education including up and down stairs     Home Exercise Program:    [x] (62014) Reviewed/Progressed HEP activities related to strengthening, flexibility, endurance, ROM of core, proximal hip and LE for functional self-care, mobility, lifting and ambulation/stair navigation   [] (40117)Reviewed/Progressed HEP activities related to improving balance, coordination, kinesthetic sense, posture, motor skill, proprioception of core, proximal hip and LE for self care, mobility, lifting, and ambulation/stair navigation      Manual Treatments:  PROM / STM / Oscillations-Mobs:  G-I, II, III, IV (PA's, Inf., Post.)  [] (65856) Provided manual therapy to mobilize LE, proximal hip and/or LS spine soft tissue/joints for the purpose of modulating pain, promoting relaxation,  increasing ROM, reducing/eliminating soft tissue swelling/inflammation/restriction, improving soft tissue extensibility and allowing for proper ROM for normal function with self care, mobility, lifting and ambulation. Modalities:      Charges:  Timed Code Treatment Minutes: 45   Total Treatment Minutes: 45       [] EVAL (LOW) 89034 (typically 20 minutes face-to-face)  [] EVAL (MOD) 89441 (typically 30 minutes face-to-face)  [] EVAL (HIGH) 12248 (typically 45 minutes face-to-face)  [] RE-EVAL     [x] OY(52447) x  3         [] IONTO (48660)  [] NMR (59008) x     [] VASO (95045)  [] Manual (01463) x     [] Other:  [] TA (01520)x     [] Mech Traction (09888)  [] ES(attended) (55537)     [] ES (un) (90058): If BWC Please Indicate Time In/Out and Total Minutes  CPT Code Time in Time out Total Min                                         GOALS:  Patient stated goal: To gain strength in the L leg. [] Progressing: [x] Met: [] Not Met: [] Adjusted    Therapist goals for Patient:   Short Term Goals: To be achieved in: 2 weeks  1. Independent in HEP and progression per patient tolerance, in order to prevent re-injury. [x] Progressing: [] Met: [] Not Met: [] Adjusted  2. Patient will have a decrease in pain to facilitate improvement in movement, function, and ADLs as indicated by Functional Deficits. [] Progressing: [x] Met: [] Not Met: [] Adjusted    Long Term Goals:  To be achieved in: 4-6 weeks  1. Disability index score of 25% or less for the LEFS to assist with reaching prior level of function. [] Progressing: [x] Met: [] Not Met: [] Adjusted  2. Patient will demonstrate an increase in Strength to good proximal hip strength and control, within 5lb HHD in LE to allow for proper functional mobility as indicated by patients Functional Deficits. [x] Progressing: [] Met: [] Not Met: [] Adjusted  3. Patient will return to prolonged ambulation, squatting, stair ascending/descending, yard/housework, and functional activities without increased symptoms or restriction. [x] Progressing: [] Met: [] Not Met: [] Adjusted  4. To gain strength in the L LE to return to PLOF (patient specific functional goal)    [x] Progressing: [] Met: [] Not Met: [] Adjusted     Progression Towards Functional goals:  [x] Patient is progressing as expected towards functional goals listed. [] Progression is slowed due to complexities listed. [] Progression has been slowed due to co-morbidities. [] Plan just implemented, too soon to assess goals progression  [] Other:     ASSESSMENT:  Patient tolerated session well. Patient has improved significantly on the LEFS and strength measurements and ROM without pain. Patient feels that at this time he will be able to continue strengthening at the gym. Patient encouraged to return to gym at least 3-4 times per week and if he has any concerns to return for continued formal PT and strengthening, The patient would benefit from continued formal check ins to full return him to PLOF.      Return to Play: (if applicable)   []  Stage 1: Intro to Strength   []  Stage 2: Return to Run and Strength   []  Stage 3: Return to Jump and Strength   []  Stage 4: Dynamic Strength and Agility   []  Stage 5: Sport Specific Training     []  Ready to Return to Play, Meets All Above Stages   []  Not Ready for Return to Sports   Comments:            Treatment/Activity Tolerance:  [x]